# Patient Record
Sex: FEMALE | Race: WHITE | Employment: OTHER | URBAN - METROPOLITAN AREA
[De-identification: names, ages, dates, MRNs, and addresses within clinical notes are randomized per-mention and may not be internally consistent; named-entity substitution may affect disease eponyms.]

---

## 2017-03-01 PROBLEM — M75.101 ROTATOR CUFF TEAR ARTHROPATHY, RIGHT: Status: ACTIVE | Noted: 2017-03-01

## 2017-03-01 PROBLEM — M12.811 ROTATOR CUFF TEAR ARTHROPATHY, RIGHT: Status: ACTIVE | Noted: 2017-03-01

## 2017-03-01 PROCEDURE — 88175 CYTOPATH C/V AUTO FLUID REDO: CPT | Performed by: FAMILY MEDICINE

## 2017-04-04 PROBLEM — G89.29 CHRONIC RIGHT SHOULDER PAIN: Status: ACTIVE | Noted: 2017-04-04

## 2017-04-04 PROBLEM — M25.511 CHRONIC RIGHT SHOULDER PAIN: Status: ACTIVE | Noted: 2017-04-04

## 2017-11-02 DIAGNOSIS — N60.12 DIFFUSE CYSTIC MASTOPATHY OF LEFT BREAST: Primary | ICD-10-CM

## 2017-11-21 ENCOUNTER — HOSPITAL ENCOUNTER (OUTPATIENT)
Dept: MAMMOGRAPHY | Facility: HOSPITAL | Age: 67
Discharge: HOME | End: 2017-11-21
Attending: SURGERY
Payer: MEDICARE

## 2017-11-21 DIAGNOSIS — N60.12 DIFFUSE CYSTIC MASTOPATHY OF LEFT BREAST: ICD-10-CM

## 2017-11-21 PROCEDURE — 77062 BREAST TOMOSYNTHESIS BI: CPT | Performed by: SURGERY

## 2017-11-21 PROCEDURE — 77066 DX MAMMO INCL CAD BI: CPT | Performed by: SURGERY

## 2017-11-21 PROCEDURE — 76642 ULTRASOUND BREAST LIMITED: CPT | Performed by: SURGERY

## 2017-11-27 ENCOUNTER — OFFICE VISIT (OUTPATIENT)
Dept: SURGERY | Facility: CLINIC | Age: 67
End: 2017-11-27

## 2017-11-27 VITALS
SYSTOLIC BLOOD PRESSURE: 158 MMHG | HEIGHT: 64.5 IN | WEIGHT: 124 LBS | HEART RATE: 56 BPM | TEMPERATURE: 98 F | BODY MASS INDEX: 20.91 KG/M2 | DIASTOLIC BLOOD PRESSURE: 78 MMHG

## 2017-11-27 DIAGNOSIS — N60.19 FIBROCYSTIC BREAST CHANGES, UNSPECIFIED LATERALITY: Primary | ICD-10-CM

## 2017-11-27 PROCEDURE — 99213 OFFICE O/P EST LOW 20 MIN: CPT | Performed by: SURGERY

## 2017-11-27 NOTE — PROGRESS NOTES
Follow Up Visit Note       Active Problems      1.  Fibrocystic breast changes, unspecified laterality          Chief Complaint   Patient presents with:  Breast Problem: Mammogram F/U 11/21        History of Present Illness    Here for follow-up after her y History    The past medical and past surgical history have been reviewed by me today. Past Medical History:   Diagnosis Date   • PND (post-nasal drip)      Past Surgical History:  No date: BREAST BIOPSY  No date:   No date: COLONOSCOPY  2001:  [place] Frieze total) by mouth daily. Disp: 30 tablet Rfl: 11        Review of Systems  The Review of Systems has been reviewed by me during today. Review of Systems   Constitutional: Negative for chills, diaphoresis, fatigue, fever and unexpected weight change.    HENT: Fibrocystic breast changes, unspecified laterality  (primary encounter diagnosis)    Pt s/p mammogram/US. Benign findings but asymmetric densities seen on the left breast mammogram but no on ultrasound. Plan   · Mammogram/US results d/w patient.   · R

## 2018-04-15 PROBLEM — F41.1 GENERALIZED ANXIETY DISORDER: Status: ACTIVE | Noted: 2018-04-15

## 2018-06-14 ENCOUNTER — HOSPITAL ENCOUNTER (OUTPATIENT)
Dept: MAMMOGRAPHY | Facility: HOSPITAL | Age: 68
Discharge: HOME | End: 2018-06-14
Attending: FAMILY MEDICINE
Payer: MEDICARE

## 2018-06-14 DIAGNOSIS — Z87.898 HX OF ABNORMAL MAMMOGRAM: ICD-10-CM

## 2018-06-14 DIAGNOSIS — R92.0 MICROCALCIFICATIONS OF THE BREAST: ICD-10-CM

## 2018-06-14 DIAGNOSIS — N60.19 FIBROCYSTIC BREAST CHANGES, UNSPECIFIED LATERALITY: ICD-10-CM

## 2018-06-14 PROCEDURE — 76642 ULTRASOUND BREAST LIMITED: CPT | Performed by: FAMILY MEDICINE

## 2018-06-14 PROCEDURE — 77061 BREAST TOMOSYNTHESIS UNI: CPT | Performed by: FAMILY MEDICINE

## 2018-06-14 PROCEDURE — 77065 DX MAMMO INCL CAD UNI: CPT | Performed by: FAMILY MEDICINE

## 2018-12-17 ENCOUNTER — HOSPITAL ENCOUNTER (OUTPATIENT)
Dept: MAMMOGRAPHY | Facility: HOSPITAL | Age: 68
Discharge: HOME | End: 2018-12-17
Attending: FAMILY MEDICINE
Payer: MEDICARE

## 2018-12-17 DIAGNOSIS — N60.19 FIBROCYSTIC BREAST CHANGES, UNSPECIFIED LATERALITY: ICD-10-CM

## 2018-12-17 DIAGNOSIS — Z87.898 HX OF ABNORMAL MAMMOGRAM: ICD-10-CM

## 2018-12-17 DIAGNOSIS — R92.0 MICROCALCIFICATIONS OF THE BREAST: ICD-10-CM

## 2018-12-17 PROCEDURE — 77066 DX MAMMO INCL CAD BI: CPT | Performed by: FAMILY MEDICINE

## 2018-12-17 PROCEDURE — 77062 BREAST TOMOSYNTHESIS BI: CPT | Performed by: FAMILY MEDICINE

## 2018-12-17 PROCEDURE — 76641 ULTRASOUND BREAST COMPLETE: CPT | Performed by: FAMILY MEDICINE

## 2019-12-13 PROBLEM — I10 BENIGN ESSENTIAL HTN: Status: ACTIVE | Noted: 2019-12-13

## 2020-01-07 ENCOUNTER — HOSPITAL ENCOUNTER (OUTPATIENT)
Dept: MAMMOGRAPHY | Facility: HOSPITAL | Age: 70
Discharge: HOME | End: 2020-01-07
Attending: FAMILY MEDICINE
Payer: MEDICARE

## 2020-01-07 DIAGNOSIS — Z12.31 ENCOUNTER FOR SCREENING MAMMOGRAM FOR MALIGNANT NEOPLASM OF BREAST: ICD-10-CM

## 2020-01-07 PROCEDURE — 77067 SCR MAMMO BI INCL CAD: CPT | Performed by: FAMILY MEDICINE

## 2020-01-07 PROCEDURE — 77063 BREAST TOMOSYNTHESIS BI: CPT | Performed by: FAMILY MEDICINE

## 2020-01-08 ENCOUNTER — HOSPITAL ENCOUNTER (OUTPATIENT)
Dept: MAMMOGRAPHY | Facility: HOSPITAL | Age: 70
Discharge: HOME | End: 2020-01-08
Attending: FAMILY MEDICINE
Payer: MEDICARE

## 2020-01-08 DIAGNOSIS — R92.2 INCONCLUSIVE MAMMOGRAM: ICD-10-CM

## 2020-01-08 PROCEDURE — 77065 DX MAMMO INCL CAD UNI: CPT | Performed by: FAMILY MEDICINE

## 2020-01-13 ENCOUNTER — APPOINTMENT (OUTPATIENT)
Dept: LAB | Age: 70
End: 2020-01-13

## 2020-08-13 PROBLEM — H43.819: Status: ACTIVE | Noted: 2020-08-13

## 2020-08-13 PROBLEM — L71.9 ROSACEA: Status: ACTIVE | Noted: 2020-08-13

## 2021-01-08 ENCOUNTER — HOSPITAL ENCOUNTER (OUTPATIENT)
Dept: MAMMOGRAPHY | Age: 71
Discharge: HOME | End: 2021-01-08
Attending: FAMILY MEDICINE
Payer: MEDICARE

## 2021-01-08 DIAGNOSIS — Z12.31 ENCOUNTER FOR SCREENING MAMMOGRAM FOR MALIGNANT NEOPLASM OF BREAST: ICD-10-CM

## 2021-01-08 PROCEDURE — 77067 SCR MAMMO BI INCL CAD: CPT | Performed by: FAMILY MEDICINE

## 2021-01-08 PROCEDURE — 77063 BREAST TOMOSYNTHESIS BI: CPT | Performed by: FAMILY MEDICINE

## 2021-01-11 ENCOUNTER — HOSPITAL ENCOUNTER (EMERGENCY)
Facility: HOSPITAL | Age: 71
Discharge: HOME | End: 2021-01-11
Attending: EMERGENCY MEDICINE
Payer: MEDICARE

## 2021-01-11 VITALS
HEART RATE: 88 BPM | DIASTOLIC BLOOD PRESSURE: 89 MMHG | RESPIRATION RATE: 18 BRPM | TEMPERATURE: 98 F | SYSTOLIC BLOOD PRESSURE: 199 MMHG | HEIGHT: 64 IN | WEIGHT: 59.13 LBS | BODY MASS INDEX: 10.09 KG/M2 | OXYGEN SATURATION: 97 %

## 2021-01-11 DIAGNOSIS — M54.40 BACK PAIN OF LUMBAR REGION WITH SCIATICA: Primary | ICD-10-CM

## 2021-01-11 DIAGNOSIS — R19.5 PENCILLING OF STOOLS: ICD-10-CM

## 2021-01-11 PROCEDURE — 99282 EMERGENCY DEPT VISIT SF MDM: CPT

## 2021-01-11 NOTE — ED PROVIDER NOTES
Patient Seen in: [facility] Emergency Department      History   Patient presents with:  Back Pain    Stated Complaint: back pain for one month / now affecting bowels    HPI/Subjective:   HPI    Patient is a very anxious 23-year-old female presents w except as noted above.     Physical Exam     ED Triage Vitals [01/11/21 1005]   BP (!) 199/89   Pulse 88   Resp 18   Temp 98 °F (36.7 °C)   Temp src Temporal   SpO2 97 %   O2 Device        Current:BP (!) 199/89   Pulse 88   Temp 98 °F (36.7 °C) (Temporal) [id number] [phone]    In 1 day      Tammi Torrez MD  [address] [facility]    In 1 week      [address]  [phone]    In 3 days            Medications [facility]

## 2021-01-11 NOTE — ED NOTES
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 She is crying and wants to leave immediately. Registration is in to register her.  Plan discharge

## 2021-01-14 ENCOUNTER — OFFICE VISIT (OUTPATIENT)
Dept: SURGERY | Facility: CLINIC | Age: 71
End: 2021-01-14
Payer: MEDICARE

## 2021-01-14 VITALS — SYSTOLIC BLOOD PRESSURE: 157 MMHG | DIASTOLIC BLOOD PRESSURE: 84 MMHG | HEART RATE: 74 BPM | TEMPERATURE: 98 F

## 2021-01-14 DIAGNOSIS — R19.4 CHANGE IN BOWEL HABITS: Primary | ICD-10-CM

## 2021-01-14 DIAGNOSIS — K57.30 DIVERTICULOSIS OF LARGE INTESTINE WITHOUT HEMORRHAGE: ICD-10-CM

## 2021-01-14 DIAGNOSIS — F41.1 GENERALIZED ANXIETY DISORDER: ICD-10-CM

## 2021-01-14 DIAGNOSIS — I10 BENIGN ESSENTIAL HTN: ICD-10-CM

## 2021-01-14 DIAGNOSIS — Z80.0 FAMILY HISTORY OF COLON CANCER: ICD-10-CM

## 2021-01-14 DIAGNOSIS — Z01.818 PRE-OP TESTING: ICD-10-CM

## 2021-01-14 PROCEDURE — 99204 OFFICE O/P NEW MOD 45 MIN: CPT | Performed by: COLON & RECTAL SURGERY

## 2021-01-14 RX ORDER — SODIUM, POTASSIUM,MAG SULFATES 17.5-3.13G
SOLUTION, RECONSTITUTED, ORAL ORAL
Qty: 1 KIT | Refills: 0 | Status: SHIPPED | OUTPATIENT
Start: 2021-01-14 | End: 2021-02-16

## 2021-01-15 NOTE — PATIENT INSTRUCTIONS
The patient presents today in consultation of the emergency department physician for a change in bowel habits. The patient states that 10 days ago she began to develop some back pain and some changes in her bowel habits.   She states that she has noticed There are no palpable masses or hernias. I discussed with the patient that it is possible that stress can be attributing to her change in bowel habits, as well has certain back issues can attribute to change in bowel habits as well.   However, we do need

## 2021-01-15 NOTE — PROGRESS NOTES
Follow Up Visit Note       Active Problems      1. Change in bowel habits    2. Diverticulosis of large intestine without hemorrhage    3. Family history of colon cancer in maternal grandfather at 77    4. Generalized anxiety disorder    5.  Benign essentia first or secondary family state of colon cancer or uterine cancer. This patient most recently had a colonoscopy with myself in 2016 where she was found to have diverticulosis. She did not have any polyps at that time.     The patient has no significant Surgical History:   Procedure Laterality Date   • BREAST BIOPSY     •      • COLONOSCOPY     • DESMOND BIOPSY STEREO NODULE 2 SITE BILAT (CPT=19081/21485)      3 sites - 1 site RUST   • DESMOND BIOPSY STEREO NODULE 2 SITE BILAT (CPT=19081/82718)   HENT: Negative for hearing loss, nosebleeds, sore throat and trouble swallowing. Respiratory: Negative for apnea, cough, shortness of breath and wheezing. Cardiovascular: Negative for chest pain, palpitations and leg swelling.    Gastrointestinal: N the left inguinal area. Clinical examination of the abdomen reveals it to be soft, nondistended, nontender, bowel sounds are normal activity normal pitch. There  is no rebounding tenderness or guarding. There are no signs of ascites or peritonitis.   Karina Lipscomb mucus, or dark tarry stools. She states she occasionally gets constipation and diarrhea. She denies any associate abdominal pain, distention, fevers, chills, nausea, vomiting, or unintentional weight loss.     This patient has a family history significant for Colonoscopy.     Koby Johnson MD

## 2021-01-19 ENCOUNTER — LAB ENCOUNTER (OUTPATIENT)
Dept: LAB | Age: 71
End: 2021-01-19
Attending: COLON & RECTAL SURGERY
Payer: MEDICARE

## 2021-01-19 DIAGNOSIS — Z01.818 PRE-OP TESTING: ICD-10-CM

## 2021-01-20 LAB — SARS-COV-2 RNA RESP QL NAA+PROBE: NOT DETECTED

## 2021-01-22 ENCOUNTER — LAB REQUISITION (OUTPATIENT)
Dept: LAB | Facility: HOSPITAL | Age: 71
End: 2021-01-22
Payer: MEDICARE

## 2021-01-22 DIAGNOSIS — S36.508A: ICD-10-CM

## 2021-01-22 PROCEDURE — 88305 TISSUE EXAM BY PATHOLOGIST: CPT | Performed by: COLON & RECTAL SURGERY

## 2021-01-22 PROCEDURE — 88342 IMHCHEM/IMCYTCHM 1ST ANTB: CPT | Performed by: COLON & RECTAL SURGERY

## 2021-01-28 ENCOUNTER — TELEPHONE (OUTPATIENT)
Dept: SURGERY | Facility: CLINIC | Age: 71
End: 2021-01-28

## 2021-01-28 NOTE — TELEPHONE ENCOUNTER
PA paged back stating able to start metamucil once daily. Also to states stomach cancer does not usually cause narrowing of stool. Pt seems more at ease. Pt will be assessed at scheduled appt.      Future Appointments   Date Time Provider Bar Miller

## 2021-01-28 NOTE — TELEPHONE ENCOUNTER
Pt called and had cscope on 1/22/2021 and states she had divertic in her colon and DJP gave her medication for it. States stool is still pencil shape and then when sitting in the toilet, it will dissolve into the water.  Pt wondering when stool will go back

## 2021-02-01 ENCOUNTER — OFFICE VISIT (OUTPATIENT)
Dept: SURGERY | Facility: CLINIC | Age: 71
End: 2021-02-01
Payer: MEDICARE

## 2021-02-01 VITALS
HEART RATE: 83 BPM | WEIGHT: 134.63 LBS | HEIGHT: 65 IN | DIASTOLIC BLOOD PRESSURE: 84 MMHG | TEMPERATURE: 97 F | SYSTOLIC BLOOD PRESSURE: 136 MMHG | BODY MASS INDEX: 22.43 KG/M2

## 2021-02-01 DIAGNOSIS — Z80.0 FAMILY HISTORY OF COLON CANCER: ICD-10-CM

## 2021-02-01 DIAGNOSIS — K57.92 ACUTE DIVERTICULITIS: Primary | ICD-10-CM

## 2021-02-01 DIAGNOSIS — K57.30 DIVERTICULOSIS OF LARGE INTESTINE WITHOUT HEMORRHAGE: ICD-10-CM

## 2021-02-01 PROCEDURE — 99215 OFFICE O/P EST HI 40 MIN: CPT | Performed by: COLON & RECTAL SURGERY

## 2021-02-01 NOTE — PATIENT INSTRUCTIONS
I am seeing this patient in consultation for new onset of acute diverticulitis. This patient underwent colonoscopy on January 22, 2021. There was a 3 cm focus of diverticulitis at 25 cm from the anal verge.   The offending diverticulum could be seen wit Most likely the pain will be along the belt level or it specifically in the left lower quadrant. She can call us at any time for any exacerbation of symptoms.     Incidentally noted the patient states that she had an MRI of the lumbar spine on January 29

## 2021-02-01 NOTE — PROGRESS NOTES
Follow Up Visit Note       Active Problems      1. Acute diverticulitis    2. Family history of colon cancer in maternal grandfather at 74    1.  Diverticulosis of large intestine without hemorrhage          Chief Complaint   Patient presents with:  Len Dee surgical history have been reviewed by me today.     Past Medical History:   Diagnosis Date   • PND (post-nasal drip)      Past Surgical History:   Procedure Laterality Date   • BREAST BIOPSY     •      • COLONOSCOPY     • DESMOND BIOPSY STEREO NODULE GM/177ML Oral Solution, Take one bottle at 5pm & 9pm the night before procedure (Patient not taking: Reported on 2/1/2021 ), Disp: 1 kit, Rfl: 0     Review of Systems  The Review of Systems has been reviewed by me during today.   Review of Systems   Constit diverticulitis. This patient underwent colonoscopy on January 22, 2021. There was a 3 cm focus of diverticulitis at 25 cm from the anal verge. The offending diverticulum could be seen with pus emanating from its orifice.     The patient was started on specifically in the left lower quadrant. She can call us at any time for any exacerbation of symptoms. Incidentally noted the patient states that she had an MRI of the lumbar spine on January 29, 2021.   She was not happy with the care of the physicia

## 2021-02-02 ENCOUNTER — TELEPHONE (OUTPATIENT)
Dept: SURGERY | Facility: CLINIC | Age: 71
End: 2021-02-02

## 2021-02-02 NOTE — TELEPHONE ENCOUNTER
Patient called saying Dr. Narcisa Sharp told patient that cscope recall is 4 years. Patient stated on paperwork it is listed as a 10 year recall.

## 2021-02-02 NOTE — TELEPHONE ENCOUNTER
Patient called saying Dr. Mary Isabel told patient that cscope recall is 4 years. Patient stated on paperwork it is listed as a 10 year recall.

## 2021-02-03 ENCOUNTER — TELEPHONE (OUTPATIENT)
Dept: SURGERY | Facility: CLINIC | Age: 71
End: 2021-02-03

## 2021-02-08 ENCOUNTER — TELEPHONE (OUTPATIENT)
Dept: SURGERY | Facility: CLINIC | Age: 71
End: 2021-02-08

## 2021-02-08 NOTE — TELEPHONE ENCOUNTER
Pt being referred to  from [address] [place] [address] for multilevel bulging disks with some impairment of the thecal sac at the L4-5 interface. During OV 2/1/21 pt states [address] [place] [address] stated this was \"semiurgent\".   Pls advise when and where

## 2021-02-16 ENCOUNTER — OFFICE VISIT (OUTPATIENT)
Dept: SURGERY | Facility: CLINIC | Age: 71
End: 2021-02-16
Payer: MEDICARE

## 2021-02-16 VITALS
BODY MASS INDEX: 22.33 KG/M2 | HEIGHT: 65 IN | SYSTOLIC BLOOD PRESSURE: 150 MMHG | WEIGHT: 134 LBS | DIASTOLIC BLOOD PRESSURE: 86 MMHG | HEART RATE: 82 BPM

## 2021-02-16 DIAGNOSIS — M47.816 LUMBAR FACET ARTHROPATHY: ICD-10-CM

## 2021-02-16 DIAGNOSIS — M43.16 SPONDYLOLISTHESIS OF LUMBAR REGION: ICD-10-CM

## 2021-02-16 DIAGNOSIS — M48.061 LUMBAR STENOSIS WITHOUT NEUROGENIC CLAUDICATION: Primary | ICD-10-CM

## 2021-02-16 DIAGNOSIS — M47.816 LUMBAR SPONDYLOSIS: ICD-10-CM

## 2021-02-16 PROCEDURE — 99204 OFFICE O/P NEW MOD 45 MIN: CPT | Performed by: PHYSICIAN ASSISTANT

## 2021-02-16 RX ORDER — LISINOPRIL 5 MG/1
5 TABLET ORAL DAILY
COMMUNITY
End: 2021-05-18 | Stop reason: ALTCHOICE

## 2021-02-16 RX ORDER — METRONIDAZOLE 250 MG/1
TABLET ORAL
COMMUNITY
Start: 2021-01-22 | End: 2021-05-18 | Stop reason: ALTCHOICE

## 2021-02-16 NOTE — PROGRESS NOTES
Saw chiropractor today feels good  Had MRI in January  When it flares up, she can not find any comfortable position  Uses ice when flares  Has some sciatic pain down the Right leg

## 2021-02-16 NOTE — H&P
Neurosurgery Clinic Visit  2021    Sheldon Webber PCP:  DO KRIS Ferrera 1950 MRN [id number]       CHIEF COMPLAINT:  Patient presents with:  Consult: bulging disc      HISTORY OF PRESENT ILLNESS:  Sheldon Webber is a(n) 72 ye mouth.     • losartan Potassium 50 MG Oral Tab Take 1 tablet (50 mg total) by mouth daily.  90 tablet 3       HISTORY:  Past Medical History:   Diagnosis Date   • PND (post-nasal drip)      Past Surgical History:   Procedure Laterality Date   • BREAST BIOPS palpation. Non-distended, with positive bowel sounds. Rectal and genital:  Exams are deferred. Muscular:  Exam reveals normal bulk and tone. Neurologic Exam: The patient is oriented to time, person, place, and situation.   Memory, attention span, langua stenosis without neurogenic claudication. 4.  Lumbar facet arthropathy. Imaging reviewed with the patient. She has facet arthropathy and lumbar stenosis at L4-5. She does not have any signs/symptoms of neurogenic claudication.   Discussed treatment op

## 2021-05-18 PROBLEM — H35.3131 NONEXUDATIVE AGE-RELATED MACULAR DEGENERATION, BILATERAL, EARLY DRY STAGE: Status: ACTIVE | Noted: 2017-03-30

## 2021-05-18 PROBLEM — H25.13 AGE-RELATED NUCLEAR CATARACT OF BOTH EYES: Status: ACTIVE | Noted: 2017-03-30

## 2021-09-20 PROBLEM — M25.511 CHRONIC RIGHT SHOULDER PAIN: Status: RESOLVED | Noted: 2017-04-04 | Resolved: 2021-09-20

## 2021-09-20 PROBLEM — K57.92 ACUTE DIVERTICULITIS: Status: RESOLVED | Noted: 2021-02-01 | Resolved: 2021-09-20

## 2021-09-20 PROBLEM — R19.4 CHANGE IN BOWEL HABITS: Status: RESOLVED | Noted: 2021-01-14 | Resolved: 2021-09-20

## 2021-09-20 PROBLEM — G89.29 CHRONIC RIGHT SHOULDER PAIN: Status: RESOLVED | Noted: 2017-04-04 | Resolved: 2021-09-20

## 2021-10-31 ENCOUNTER — HOSPITAL ENCOUNTER (EMERGENCY)
Facility: HOSPITAL | Age: 71
Discharge: HOME | End: 2021-10-31
Attending: EMERGENCY MEDICINE
Payer: MEDICARE

## 2021-10-31 VITALS
TEMPERATURE: 98 F | DIASTOLIC BLOOD PRESSURE: 74 MMHG | RESPIRATION RATE: 16 BRPM | SYSTOLIC BLOOD PRESSURE: 127 MMHG | WEIGHT: 115 LBS | BODY MASS INDEX: 19.16 KG/M2 | HEART RATE: 76 BPM | HEIGHT: 65 IN | OXYGEN SATURATION: 99 %
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
## 2021-10-31 PROCEDURE — 99282 EMERGENCY DEPT VISIT SF MDM: CPT

## 2021-11-01 NOTE — ED PROVIDER NOTES
Patient Seen in: [facility] Emergency Department      History   Patient presents with:  Bite Sting,Insect    Stated Complaint: tick bite     Subjective:   HPI    Patient is a 71-year-old female presenting for evaluation of tick removal.    Patient h Conjunctiva/sclera: Conjunctivae normal.   Cardiovascular:      Rate and Rhythm: Normal rate. Pulmonary:      Effort: Pulmonary effort is normal.   Abdominal:      General: Abdomen is flat. Palpations: Abdomen is soft.        Musculoskeletal:

## 2021-11-01 NOTE — ED INITIAL ASSESSMENT (HPI)
Pt states \"there is a tick on my stomach, I just need a doctor to pull it out and now there is a red spot, it looks like a lyme disease tick. \" Denies fevers.

## 2021-11-03 PROBLEM — E46 PROTEIN-CALORIE MALNUTRITION, UNSPECIFIED SEVERITY (HCC): Status: RESOLVED | Noted: 2021-11-03 | Resolved: 2021-11-03

## 2021-11-03 PROBLEM — E46 PROTEIN-CALORIE MALNUTRITION, UNSPECIFIED SEVERITY (HCC): Status: ACTIVE | Noted: 2021-11-03

## 2022-01-10 ENCOUNTER — HOSPITAL ENCOUNTER (OUTPATIENT)
Dept: MAMMOGRAPHY | Facility: HOSPITAL | Age: 72
Discharge: HOME | End: 2022-01-10
Attending: FAMILY MEDICINE
Payer: MEDICARE

## 2022-01-10 DIAGNOSIS — Z12.31 SCREENING MAMMOGRAM FOR BREAST CANCER: ICD-10-CM

## 2022-01-10 PROCEDURE — 77067 SCR MAMMO BI INCL CAD: CPT | Performed by: FAMILY MEDICINE

## 2022-01-10 PROCEDURE — 77063 BREAST TOMOSYNTHESIS BI: CPT | Performed by: FAMILY MEDICINE

## 2022-12-28 ENCOUNTER — HOSPITAL ENCOUNTER (EMERGENCY)
Facility: HOSPITAL | Age: 72
Discharge: LEFT BEFORE DISCHARGE | End: 2022-12-28
Payer: MEDICARE

## 2022-12-28 VITALS
TEMPERATURE: 98 F | OXYGEN SATURATION: 100 % | SYSTOLIC BLOOD PRESSURE: 159 MMHG | RESPIRATION RATE: 20 BRPM | HEART RATE: 75 BPM | DIASTOLIC BLOOD PRESSURE: 75 MMHG

## 2022-12-28 NOTE — ED QUICK NOTES
Pt offered multiple times to sit in a chair and be escorted to a place where she can stand. Pt refused.

## 2023-01-25 ENCOUNTER — TELEPHONE (OUTPATIENT)
Facility: LOCATION | Age: 73
End: 2023-01-25

## 2023-01-25 ENCOUNTER — HOSPITAL ENCOUNTER (OUTPATIENT)
Dept: MAMMOGRAPHY | Facility: HOSPITAL | Age: 73
Discharge: HOME | End: 2023-01-25
Attending: OBSTETRICS & GYNECOLOGY
Payer: MEDICARE

## 2023-01-25 DIAGNOSIS — Z12.31 ENCOUNTER FOR SCREENING MAMMOGRAM FOR MALIGNANT NEOPLASM OF BREAST: ICD-10-CM

## 2023-01-25 PROCEDURE — 77063 BREAST TOMOSYNTHESIS BI: CPT | Performed by: OBSTETRICS & GYNECOLOGY

## 2023-01-25 PROCEDURE — 77067 SCR MAMMO BI INCL CAD: CPT | Performed by: OBSTETRICS & GYNECOLOGY

## 2023-01-25 NOTE — TELEPHONE ENCOUNTER
Spoke to patient. Advised patient she may start the Omprazole per Dr. Richie Rivera and keep her appointment with Dr. Onelia Georges for Monday 1/30/2023. Patient voiced understanding.    Future Appointments   Date Time Provider Bar Miller   1/30/2023  1:15 PM Dariela Lundberg MD [facility]

## 2023-01-25 NOTE — TELEPHONE ENCOUNTER
Pt previously saw Mellissa Puente, but her pain is more upper GI, so she hasn't yet seen Angelina. Her GI doctor prescriber her omeprazole, but she wants to meet another doctor to figure out what it is before taking it. Wants to speak to a nurse to see if she should start taking it now or wait to meet Dr Rima Bridges on Monday 1/30.     Please advise  Best callback number is [phone]

## 2023-01-26 ENCOUNTER — TELEPHONE (OUTPATIENT)
Facility: LOCATION | Age: 73
End: 2023-01-26

## 2023-01-26 NOTE — TELEPHONE ENCOUNTER
Pt is taking florastor which is a probiotic, and has been able to stop the pain, and she's wondering if she can still take it while she's taking the omeprazole    Please advise  Best callback number is [phone]

## 2023-01-27 NOTE — TELEPHONE ENCOUNTER
Called and s/w pt, ok to take probiotic. Pt states she started omeprazole yesterday and stomach is feeling much better.

## 2023-01-30 ENCOUNTER — OFFICE VISIT (OUTPATIENT)
Facility: LOCATION | Age: 73
End: 2023-01-30
Payer: MEDICARE

## 2023-01-30 VITALS — TEMPERATURE: 99 F | HEART RATE: 71 BPM

## 2023-01-30 DIAGNOSIS — R10.13 EPIGASTRIC ABDOMINAL PAIN: Primary | ICD-10-CM

## 2023-02-01 ENCOUNTER — TELEPHONE (OUTPATIENT)
Facility: LOCATION | Age: 73
End: 2023-02-01

## 2023-02-01 NOTE — TELEPHONE ENCOUNTER
ransaction ID: [id number] ID: [id number] Date: 2023-02-01  Vannesa Palma Patient  Member ID  [id number]    Date of Birth  1950-02-28    Gender  NA    Transaction Type  Outpatient Authorization    Organization  [address] [place] [address] logo  Certificate Information  Reference Number  [id number]    Status  NO ACTION REQUIRED    Message  Requested Service does not require preauthorization. We would strongly encourage you to check benefits for this service.     Member Information  Patient Name  Vannesa Palma    Patient Date of Birth  1950-02-28    Member ID  [id number]    Relationship to [address] Name  Vannesa Palma    CODE [id number] FOR PROCEDURE AND DIAGNOSIS CODE R10.13 FOR EGD

## 2023-02-03 ENCOUNTER — LAB REQUISITION (OUTPATIENT)
Age: 73
End: 2023-02-03
Payer: MEDICARE

## 2023-02-03 DIAGNOSIS — R10.13 EPIGASTRIC PAIN: ICD-10-CM

## 2023-02-03 PROCEDURE — 88305 TISSUE EXAM BY PATHOLOGIST: CPT | Performed by: SURGERY

## 2024-02-07 ENCOUNTER — HOSPITAL ENCOUNTER (OUTPATIENT)
Dept: MAMMOGRAPHY | Facility: HOSPITAL | Age: 74
Discharge: HOME | End: 2024-02-07
Attending: OBSTETRICS & GYNECOLOGY
Payer: MEDICARE

## 2024-02-07 DIAGNOSIS — Z12.31 ENCOUNTER FOR SCREENING MAMMOGRAM FOR MALIGNANT NEOPLASM OF BREAST: ICD-10-CM

## 2024-02-07 PROCEDURE — 77063 BREAST TOMOSYNTHESIS BI: CPT | Performed by: OBSTETRICS & GYNECOLOGY

## 2024-02-07 PROCEDURE — 77067 SCR MAMMO BI INCL CAD: CPT | Performed by: OBSTETRICS & GYNECOLOGY

## 2025-02-10 ENCOUNTER — HOSPITAL ENCOUNTER (OUTPATIENT)
Dept: MAMMOGRAPHY | Facility: HOSPITAL | Age: 75
Discharge: HOME | End: 2025-02-10
Attending: OBSTETRICS & GYNECOLOGY
Payer: MEDICARE

## 2025-02-10 DIAGNOSIS — Z12.31 SCREENING MAMMOGRAM FOR BREAST CANCER: ICD-10-CM

## 2025-02-10 PROCEDURE — 77063 BREAST TOMOSYNTHESIS BI: CPT | Performed by: OBSTETRICS & GYNECOLOGY

## 2025-02-10 PROCEDURE — 77067 SCR MAMMO BI INCL CAD: CPT | Performed by: OBSTETRICS & GYNECOLOGY

## 2025-02-11 ENCOUNTER — OFFICE VISIT (OUTPATIENT)
Dept: OTHER | Facility: HOSPITAL | Age: 75
End: 2025-02-11
Attending: PREVENTIVE MEDICINE

## 2025-02-12 ENCOUNTER — HOSPITAL ENCOUNTER (OUTPATIENT)
Dept: MAMMOGRAPHY | Facility: HOSPITAL | Age: 75
Discharge: HOME | End: 2025-02-12
Attending: OBSTETRICS & GYNECOLOGY
Payer: MEDICARE

## 2025-02-12 DIAGNOSIS — R92.8 ABNORMAL FINDING ON MAMMOGRAPHY: ICD-10-CM

## 2025-02-12 PROCEDURE — 76642 ULTRASOUND BREAST LIMITED: CPT | Performed by: OBSTETRICS & GYNECOLOGY

## 2025-02-12 PROCEDURE — 77061 BREAST TOMOSYNTHESIS UNI: CPT | Performed by: OBSTETRICS & GYNECOLOGY

## 2025-02-12 PROCEDURE — 77065 DX MAMMO INCL CAD UNI: CPT | Performed by: OBSTETRICS & GYNECOLOGY

## (undated) NOTE — Clinical Note
I had the pleasure of seeing Molly Self on 1/30/2023. Please see my attached note.   Mc Hudson MD FACS [facility]

## (undated) NOTE — LETTER
21    Patient: Audra Floyd  : 1950 Visit date: 2021    Dear  Mathieu Pagan MD      This patient came to me for a visit regarding acute diverticulitis. It is minimal and mild. She happened to bring up another topic.     In

## (undated) NOTE — LETTER
21    Patient: Esau Layne  : 1950 Visit date: 2021    Dear  Alvarze Worley, DO    Thank you for referring Esau Layne to my practice. Please find my assessment and plan below.     Assessment   Change in bowel habits This patient most recently had a colonoscopy with myself in 2016 where she was found to have diverticulosis. She did not have any polyps at that time. The patient has no significant past surgical history.   The patient has a past medical history signifi

## (undated) NOTE — LETTER
21    Patient: Kay Oneill  : 1950 Visit date: 2021    Dear  Teena Dennis DO    Thank you for referring Kay Oneill to my practice. Please find my assessment and plan below.     Assessment   Acute diverticulitis  (p I counseled her that we only operate if there are 2 well documented attacks requiring antibiotic therapy and/or hospitalization. I counseled her on the symptoms to watch for.   If she gets abdominal distention with pain, abdominal pain with fever, severe